# Patient Record
Sex: FEMALE | Race: WHITE | NOT HISPANIC OR LATINO | Employment: STUDENT | URBAN - METROPOLITAN AREA
[De-identification: names, ages, dates, MRNs, and addresses within clinical notes are randomized per-mention and may not be internally consistent; named-entity substitution may affect disease eponyms.]

---

## 2023-09-28 PROBLEM — F43.10 POST TRAUMATIC STRESS DISORDER (PTSD): Status: ACTIVE | Noted: 2023-09-28

## 2023-09-28 PROBLEM — N92.0 HEAVY MENSES: Status: ACTIVE | Noted: 2023-09-28

## 2023-09-28 PROBLEM — R55 SYNCOPE: Status: ACTIVE | Noted: 2023-09-28

## 2023-09-28 PROBLEM — F42.9 OBSESSIVE COMPULSIVE DISORDER: Status: ACTIVE | Noted: 2023-09-28

## 2023-09-28 PROBLEM — F32.1 MODERATE SINGLE CURRENT EPISODE OF MAJOR DEPRESSIVE DISORDER (MULTI): Status: ACTIVE | Noted: 2023-09-28

## 2023-09-28 PROBLEM — J45.20 ASTHMA, INTERMITTENT, UNCOMPLICATED (HHS-HCC): Status: ACTIVE | Noted: 2023-09-28

## 2023-09-28 PROBLEM — F41.1 GENERALIZED ANXIETY DISORDER: Status: ACTIVE | Noted: 2023-09-28

## 2023-09-28 PROBLEM — G43.909 MIGRAINES: Status: ACTIVE | Noted: 2023-09-28

## 2023-09-28 PROBLEM — J45.21 MILD INTERMITTENT ASTHMA WITH ACUTE EXACERBATION (HHS-HCC): Status: ACTIVE | Noted: 2023-09-28

## 2023-09-28 RX ORDER — ALBUTEROL SULFATE 90 UG/1
2 AEROSOL, METERED RESPIRATORY (INHALATION) EVERY 4 HOURS PRN
COMMUNITY
Start: 2016-04-06 | End: 2023-10-17 | Stop reason: SDUPTHER

## 2023-09-28 RX ORDER — CEFDINIR 300 MG/1
2 CAPSULE ORAL DAILY
COMMUNITY
Start: 2019-08-22 | End: 2023-10-17 | Stop reason: SDUPTHER

## 2023-09-28 RX ORDER — PROMETHAZINE HYDROCHLORIDE 6.25 MG/5ML
10 SYRUP ORAL 2 TIMES DAILY
COMMUNITY
Start: 2021-12-08 | End: 2023-11-17 | Stop reason: WASHOUT

## 2023-09-28 RX ORDER — OSELTAMIVIR PHOSPHATE 75 MG/1
1 CAPSULE ORAL 2 TIMES DAILY
COMMUNITY
Start: 2023-01-04 | End: 2023-11-17 | Stop reason: WASHOUT

## 2023-09-28 RX ORDER — ALBUTEROL SULFATE 0.83 MG/ML
SOLUTION RESPIRATORY (INHALATION)
COMMUNITY
Start: 2016-04-06 | End: 2024-04-04 | Stop reason: ALTCHOICE

## 2023-09-28 RX ORDER — ESCITALOPRAM OXALATE 10 MG/1
1 TABLET ORAL DAILY
COMMUNITY
Start: 2022-05-29 | End: 2023-11-17 | Stop reason: WASHOUT

## 2023-09-28 RX ORDER — LEVONORGESTREL 52 MG/1
1 INTRAUTERINE DEVICE INTRAUTERINE ONCE
COMMUNITY

## 2023-09-28 RX ORDER — ESCITALOPRAM OXALATE 20 MG/1
1 TABLET ORAL DAILY
COMMUNITY
Start: 2022-05-29 | End: 2024-03-15

## 2023-09-28 RX ORDER — METHYLPREDNISOLONE 4 MG/1
4 TABLET ORAL
COMMUNITY
Start: 2023-01-04 | End: 2023-10-17 | Stop reason: SDUPTHER

## 2023-09-28 RX ORDER — TRAZODONE HYDROCHLORIDE 50 MG/1
1 TABLET ORAL NIGHTLY
COMMUNITY
Start: 2022-05-29 | End: 2024-04-02 | Stop reason: SDUPTHER

## 2023-10-17 ENCOUNTER — TELEMEDICINE (OUTPATIENT)
Dept: PRIMARY CARE | Facility: CLINIC | Age: 18
End: 2023-10-17
Payer: COMMERCIAL

## 2023-10-17 DIAGNOSIS — J45.40 MODERATE PERSISTENT ASTHMA, UNSPECIFIED WHETHER COMPLICATED (HHS-HCC): Primary | ICD-10-CM

## 2023-10-17 DIAGNOSIS — J01.00 ACUTE NON-RECURRENT MAXILLARY SINUSITIS: ICD-10-CM

## 2023-10-17 PROCEDURE — 99213 OFFICE O/P EST LOW 20 MIN: CPT | Performed by: FAMILY MEDICINE

## 2023-10-17 RX ORDER — CEFDINIR 300 MG/1
600 CAPSULE ORAL DAILY
Qty: 20 CAPSULE | Refills: 0 | Status: SHIPPED | OUTPATIENT
Start: 2023-10-17 | End: 2023-10-27

## 2023-10-17 RX ORDER — METHYLPREDNISOLONE 4 MG/1
4 TABLET ORAL ONCE
Qty: 1 TABLET | Refills: 0 | Status: SHIPPED | OUTPATIENT
Start: 2023-10-17 | End: 2023-10-17

## 2023-10-17 RX ORDER — ALBUTEROL SULFATE 90 UG/1
2 AEROSOL, METERED RESPIRATORY (INHALATION) EVERY 4 HOURS PRN
Qty: 18 G | Refills: 0 | Status: SHIPPED | OUTPATIENT
Start: 2023-10-17

## 2023-10-17 ASSESSMENT — PATIENT HEALTH QUESTIONNAIRE - PHQ9
SUM OF ALL RESPONSES TO PHQ9 QUESTIONS 1 AND 2: 0
2. FEELING DOWN, DEPRESSED OR HOPELESS: NOT AT ALL
1. LITTLE INTEREST OR PLEASURE IN DOING THINGS: NOT AT ALL

## 2023-10-17 ASSESSMENT — ENCOUNTER SYMPTOMS
LOSS OF SENSATION IN FEET: 0
SINUS PAIN: 1
COUGH: 1
OCCASIONAL FEELINGS OF UNSTEADINESS: 0
DEPRESSION: 0
WHEEZING: 1

## 2023-10-26 ENCOUNTER — TELEMEDICINE (OUTPATIENT)
Dept: BEHAVIORAL HEALTH | Facility: CLINIC | Age: 18
End: 2023-10-26
Payer: COMMERCIAL

## 2023-10-26 DIAGNOSIS — F41.1 GENERALIZED ANXIETY DISORDER: Primary | ICD-10-CM

## 2023-10-26 PROCEDURE — 99214 OFFICE O/P EST MOD 30 MIN: CPT | Performed by: PSYCHIATRY & NEUROLOGY

## 2023-10-30 NOTE — PROGRESS NOTES
"Virtual appointment with patient.   Consent obtained for this platform and identification verified.  She was located on campus of [facility].      She is earning good grades at [employer].   She would like to return to [place] and is working on a petition to present to the school.       She has been adherent to Escitalopram 20 mg every morning and Trazodone 50 mg every evening.   Regimen is well-tolerated and helps with mood/anxiety and sleep.     Regarding mood, she reports \"feeling a lot better, looking forward to doing more\".   She adds appetite has improved and weight has achieved a healthier level of 115 pounds compared to 95 pounds in May.      She denies prominent OCD symptoms.       She is earning good grades at [employer], however as noted above wants to return to [employer] because of the [employer] major it offers.         She denies suicidal ideation.  No aggression or self-harm.       No zohaib or hallucinations.        Rare cannabis use.        MSE:   Normal dress and grooming.   Speech normal tone,rate, quality.  Thought process goal-directed.  Euthymic mood, full range of affect.   Denies suicidal or homicidal ideation.   No zohaib or hallucinations.   No agitation   Alert and oriented X 4.   Judgment and insight good.      Dx:  Generalized Anxiety Disorder; Major Depressive Disorder, in remission; Obsessive Compulsive Disorder    Plan:    Continue Escitalopram 20 mg every morning.  Ongoing consent obtained.  Has sufficient supply at home.       Continue Trazodone 50 mg every evening.   Ongoing consent obtained.   Has sufficient supply at home.      Therapy    Letter requested as part of petition to return to [place] will be provided.      Follow-up 2-3 months, call as needed in the interim.          "

## 2023-11-17 ENCOUNTER — OFFICE VISIT (OUTPATIENT)
Dept: OBSTETRICS AND GYNECOLOGY | Facility: CLINIC | Age: 18
End: 2023-11-17
Payer: COMMERCIAL

## 2023-11-17 VITALS
BODY MASS INDEX: 20.99 KG/M2 | HEIGHT: 65 IN | SYSTOLIC BLOOD PRESSURE: 120 MMHG | WEIGHT: 126 LBS | DIASTOLIC BLOOD PRESSURE: 72 MMHG

## 2023-11-17 DIAGNOSIS — R10.2 PELVIC CRAMPING: ICD-10-CM

## 2023-11-17 DIAGNOSIS — N92.6 IRREGULAR BLEEDING: Primary | ICD-10-CM

## 2023-11-17 DIAGNOSIS — T83.32XA MALPOSITIONED INTRAUTERINE DEVICE (IUD), INITIAL ENCOUNTER: ICD-10-CM

## 2023-11-17 LAB — PREGNANCY TEST URINE, POC: NEGATIVE

## 2023-11-17 PROCEDURE — 81025 URINE PREGNANCY TEST: CPT | Performed by: OBSTETRICS & GYNECOLOGY

## 2023-11-17 PROCEDURE — 1036F TOBACCO NON-USER: CPT | Performed by: OBSTETRICS & GYNECOLOGY

## 2023-11-17 PROCEDURE — 99214 OFFICE O/P EST MOD 30 MIN: CPT | Performed by: OBSTETRICS & GYNECOLOGY

## 2023-11-17 ASSESSMENT — ENCOUNTER SYMPTOMS
DIARRHEA: 1
FREQUENCY: 0
ANOREXIA: 0
CONSTIPATION: 1
MYALGIAS: 0
ABDOMINAL PAIN: 1
BELCHING: 1
HEADACHES: 0
HEMATOCHEZIA: 0
WEIGHT LOSS: 0
NAUSEA: 0
DYSPHORIC MOOD: 1
HEMATURIA: 0
VOMITING: 0
DYSURIA: 0
FEVER: 0
FLATUS: 1
ARTHRALGIAS: 0

## 2023-11-17 NOTE — PROGRESS NOTES
HPI    - PT SAYS SHE CAN FEEL THE IUD AND SHE HAS BEEN HAVING SPOTTING THEN REGULAR CYCLE FOR TWO WEEKS STRAIGHT  Last edited by Seng Pastor MA on 11/17/2023 11:52 AM.

## 2023-11-17 NOTE — PROGRESS NOTES
"SUBJECTIVE    19 yo G0 presents with c/o irregular bleeding, cramping.   Last visit 7/2021 for IUD check.  Mirena IUD placed 6/2021.   After initially having some irregular bleeding was then amenorrheic for the year after IUD placement.   Approximately one year ago started to have brownish discharge and over the course of the last few months has had reddish brown bleeding and lower pelvic cramping for several weeks out of the month. Also feels like she can feel the strings.   Recent STD screen negative per pt.   IO Urine preg test negative.   Anxiety on escitalopram, trazodone.   Recently back from [place] for 6 weeks. Taking a year off from [place] to take classes at [employer].     Review of Systems   Constitutional:  Negative for fever.   Gastrointestinal:  Positive for abdominal pain. Negative for nausea and vomiting.   Genitourinary:  Negative for dysuria, frequency and hematuria.   Musculoskeletal:  Negative for arthralgias and myalgias.   Neurological:  Negative for headaches.   Psychiatric/Behavioral:  Positive for dysphoric mood (anxiety).      OBJECTIVE    /72   Ht 1.651 m (5' 5\")   Wt 57.2 kg (126 lb)   LMP 11/03/2023 Comment: IUDM  BMI 20.97 kg/m²     IO Transvaginal ultrasound:  Mirena IUD located lower uterine segment within endometrial canal.     Physical Exam     Constitutional: Alert and in no acute distress. Well developed, well nourished   Abdomen: soft nontender; no abdominal mass palpated, no organomegaly and no hernias   Genitourinary: external genitalia: normal, no inguinal lymphadenopathy, [place]tholin's urethral and [place]'s glands: normal, urethra: normal, bladder: normal on palpation and perianal area: normal   Vagina: normal.   Cervix: Normal appearing without lesions. IUD strings protruding through cervix 5mm next to cervical opening.   Uterus: Normal, mobile, nontender.  Right Adnexa/parametria: Normal.   Left Adnexa/parametria: Normal.   Psychiatric: alert and oriented x 3., affect " normal to patient baseline and mood: appropriate     ASSESSMENT/PLAN  Mirena IUD malpositioned lower uterine segment.   Plan for IUD removal and reinsertion. My office will check insurance coverage.  Back up contraceptive method until replacement.     Aleshia Meek MD

## 2023-11-29 ENCOUNTER — OFFICE VISIT (OUTPATIENT)
Dept: PRIMARY CARE | Facility: CLINIC | Age: 18
End: 2023-11-29
Payer: COMMERCIAL

## 2023-11-29 ENCOUNTER — ANCILLARY PROCEDURE (OUTPATIENT)
Dept: RADIOLOGY | Facility: CLINIC | Age: 18
End: 2023-11-29
Payer: COMMERCIAL

## 2023-11-29 VITALS
OXYGEN SATURATION: 98 % | DIASTOLIC BLOOD PRESSURE: 70 MMHG | HEART RATE: 91 BPM | BODY MASS INDEX: 21.3 KG/M2 | WEIGHT: 128 LBS | SYSTOLIC BLOOD PRESSURE: 108 MMHG

## 2023-11-29 DIAGNOSIS — S86.912A STRAIN OF LEFT KNEE, INITIAL ENCOUNTER: Primary | ICD-10-CM

## 2023-11-29 DIAGNOSIS — S86.912A STRAIN OF LEFT KNEE, INITIAL ENCOUNTER: ICD-10-CM

## 2023-11-29 PROBLEM — R55 SYNCOPE: Status: RESOLVED | Noted: 2023-09-28 | Resolved: 2023-11-29

## 2023-11-29 PROCEDURE — 99213 OFFICE O/P EST LOW 20 MIN: CPT | Performed by: FAMILY MEDICINE

## 2023-11-29 PROCEDURE — 1036F TOBACCO NON-USER: CPT | Performed by: FAMILY MEDICINE

## 2023-11-29 PROCEDURE — 73562 X-RAY EXAM OF KNEE 3: CPT | Mod: LT,FY

## 2023-11-29 PROCEDURE — 73562 X-RAY EXAM OF KNEE 3: CPT | Mod: LEFT SIDE | Performed by: RADIOLOGY

## 2023-11-29 ASSESSMENT — ENCOUNTER SYMPTOMS
ARTHRALGIAS: 1
OCCASIONAL FEELINGS OF UNSTEADINESS: 0
JOINT SWELLING: 1
LOSS OF SENSATION IN FEET: 0
DEPRESSION: 0

## 2023-11-29 NOTE — LETTER
November 29, 2023     Patient: Renetta Chauhan   YOB: 2005   Date of Visit: 11/29/2023       To Whom It May Concern:    Renetta Chauhan was seen in my clinic on 11/29/2023 at 1:00PM. Please excuse Renetta for her absence from school on this day to make the appointment and she can return Monday, 12/4/2023.    If you have any questions or concerns, please don't hesitate to call.         Sincerely,         Lorelei Johns,         CC: No Recipients

## 2023-11-29 NOTE — PROGRESS NOTES
Subjective   Patient ID: Renetta Chauhan is a 18 y.o. female who presents for Fall (FELL ON L KNEE).    Fall       Pt slipped on ice today and twisted her l knee  She is in a lot of pain and is unable to bear weight  It is swollen, hard to extend    Review of Systems   Musculoskeletal:  Positive for arthralgias and joint swelling.   All other systems reviewed and are negative.      Objective   /70   Pulse 91   Wt 58.1 kg (128 lb)   LMP 11/03/2023 Comment: IUDM  SpO2 98%   BMI 21.30 kg/m²     Physical Exam  Constitutional:       Appearance: Normal appearance.   Pulmonary:      Effort: Pulmonary effort is normal.   Musculoskeletal:         General: Swelling and tenderness present.      Comments: Dec rom in all planes l knee  Edema, pain patella and medially   Neurological:      General: No focal deficit present.      Mental Status: She is alert and oriented to person, place, and time.   Psychiatric:         Mood and Affect: Mood normal.         Behavior: Behavior normal.         Assessment/Plan   Diagnoses and all orders for this visit:  Strain of left knee, initial encounter    Xray  RICE therapy   Brace crutches

## 2023-12-01 ENCOUNTER — TELEPHONE (OUTPATIENT)
Dept: OBSTETRICS AND GYNECOLOGY | Facility: CLINIC | Age: 18
End: 2023-12-01
Payer: COMMERCIAL

## 2024-03-05 ENCOUNTER — TELEPHONE (OUTPATIENT)
Dept: BEHAVIORAL HEALTH | Facility: CLINIC | Age: 19
End: 2024-03-05
Payer: COMMERCIAL

## 2024-03-11 RX ORDER — ESCITALOPRAM OXALATE 20 MG/1
20 TABLET ORAL DAILY
Qty: 90 TABLET | Refills: 0 | OUTPATIENT
Start: 2024-03-11

## 2024-03-11 RX ORDER — TRAZODONE HYDROCHLORIDE 50 MG/1
50 TABLET ORAL NIGHTLY
Qty: 90 TABLET | Refills: 0 | OUTPATIENT
Start: 2024-03-11

## 2024-03-14 DIAGNOSIS — F41.1 GENERALIZED ANXIETY DISORDER: Primary | ICD-10-CM

## 2024-03-15 RX ORDER — ESCITALOPRAM OXALATE 20 MG/1
20 TABLET ORAL DAILY
Qty: 90 TABLET | Refills: 0 | Status: SHIPPED | OUTPATIENT
Start: 2024-03-15

## 2024-03-16 DIAGNOSIS — Z20.828 EXPOSURE TO INFLUENZA: Primary | ICD-10-CM

## 2024-03-16 RX ORDER — OSELTAMIVIR PHOSPHATE 75 MG/1
75 CAPSULE ORAL DAILY
Qty: 10 CAPSULE | Refills: 0 | Status: SHIPPED | OUTPATIENT
Start: 2024-03-16 | End: 2024-03-26

## 2024-04-02 ENCOUNTER — TELEMEDICINE (OUTPATIENT)
Dept: BEHAVIORAL HEALTH | Facility: CLINIC | Age: 19
End: 2024-04-02
Payer: COMMERCIAL

## 2024-04-02 DIAGNOSIS — F41.1 GENERALIZED ANXIETY DISORDER: Primary | ICD-10-CM

## 2024-04-02 PROCEDURE — 99214 OFFICE O/P EST MOD 30 MIN: CPT | Performed by: PSYCHIATRY & NEUROLOGY

## 2024-04-02 RX ORDER — TRAZODONE HYDROCHLORIDE 50 MG/1
50 TABLET ORAL NIGHTLY
Qty: 90 TABLET | Refills: 1 | Status: SHIPPED | OUTPATIENT
Start: 2024-04-02 | End: 2024-09-29

## 2024-04-04 NOTE — PROGRESS NOTES
"Virtual appointment with patient.  Consent obtained for this platform and identification verified.  She was located at [facility].      Patient reports \"everything is fine\".      Petition to return to [employer] accepted and she has been attending since January.   She is earning good grades.       She has been adherent to Escitalopram 20 mg every morning and Trazodone 50 mg every evening.  She denies adverse effects.  Regimen helps with mood/anxiety and sleep.      She denies suicidal ideation or self-harm.  No aggression.    No zohaib or hallucinations.       Some OCD type checking behaviors however not time consuming nor interfering with functioning.       She reports social cannabis use a couple times/week.      Involved discussion during appointment about her tendency to ask friends for their prescribed stimulants when she has more extensive studying to do.  She has purchased on a few occasions and readily admits taking is counterproductive and she gets less done rather than more.  We also reviewed comprehensive risks associated.  She agreed to stop and we will also pursue appropriate therapist to process.      She denies physical complaints.      MSE:   Normal dress and grooming.  Thought process goal-directed.  Speech normal tone, rate, quality.  Euthymic mood, normal range of affect.   Denies suicidal or homicidal ideation.  Alert and oriented. X 4.  No agitation.   Judgment and insight fair    Dx:  Generalized Anxiety Disorder; Obsessive Compulsive Disorder;  Major Depressive Disorder, in remission    Plan:    Continue Escitalopram 20 mg every morning.  Ongoing consent obtained.   Has sufficient supply.      Continue Trazodone 50 mg every evening.   Ongoing consent obtained.  Rx for 50 mg #90 with one refill sent to [facility]    Seeking therapist per above discussion.     Follow-up in 3 months, call as needed in the interim      "

## 2024-08-21 DIAGNOSIS — F41.1 GENERALIZED ANXIETY DISORDER: ICD-10-CM

## 2024-08-21 RX ORDER — ESCITALOPRAM OXALATE 20 MG/1
20 TABLET ORAL DAILY
Qty: 90 TABLET | Refills: 1 | Status: SHIPPED | OUTPATIENT
Start: 2024-08-21

## 2024-08-21 RX ORDER — TRAZODONE HYDROCHLORIDE 50 MG/1
50 TABLET ORAL NIGHTLY
Qty: 90 TABLET | Refills: 1 | Status: SHIPPED | OUTPATIENT
Start: 2024-08-21 | End: 2025-02-17

## 2024-08-22 ENCOUNTER — TELEMEDICINE (OUTPATIENT)
Dept: BEHAVIORAL HEALTH | Facility: CLINIC | Age: 19
End: 2024-08-22
Payer: COMMERCIAL

## 2024-08-22 DIAGNOSIS — F41.1 GENERALIZED ANXIETY DISORDER: Primary | ICD-10-CM

## 2024-08-22 PROCEDURE — 99213 OFFICE O/P EST LOW 20 MIN: CPT | Performed by: PSYCHIATRY & NEUROLOGY

## 2024-08-23 NOTE — PROGRESS NOTES
Virtual appointment with patient via zoom due to having trouble with [email or web address] access.  Consent obtained for this platform and identification verified.   She was located at home in [place].       She has been mostly adherent to medication regimen of Escitalopram 20 mg every morning and Trazodone 50 mg every evening.  She states they continue to help with mood and sleep and denies adverse effects.      She had 2 separate one week periods of not taking the medications and she felt more anxious/irritable and had more difficulty sleeping.      Denies OCD symptoms     She denies any sustained sadness.  She denies suicidal ideation or self-harm.    No zohaib or hallucinations.     Infrequent cannabis use.      She did well academically last semester at [employer].    She is future-oriented ie thinking about [employer] after graduation.      As follow-up to discussion at last visit, there was no further use of peer stimulant medication for lengthy study sessions.      MSE:  Normal dress and grooming.   Thought process goal-directed.  Speech normal tone, rate, quality.  Euthymic mood, full range of affect.   Denies suicidal or homicidal ideation.   Alert and oriented X 4.  Future-oriented.   Judgment and insight fair/good    Dx:  Generalized Anxiety Disorder; Obsessive Compulsive Disorder;  Major Depressive Disorder, in remission    Plan:    Continue Escitalopram 20 mg every morning.   Ongoing consent obtained.  90 day Rx sent 8/21    Continue Trazodone 50 mg every evening.   Ongoing consent obtained.  90 day Rx sent 8/21    Follow-up 12/2024, call as needed in the interim

## 2024-09-04 DIAGNOSIS — J45.40 MODERATE PERSISTENT ASTHMA, UNSPECIFIED WHETHER COMPLICATED (HHS-HCC): ICD-10-CM

## 2024-09-04 RX ORDER — AZITHROMYCIN 250 MG/1
TABLET, FILM COATED ORAL
Qty: 6 TABLET | Refills: 0 | Status: SHIPPED | OUTPATIENT
Start: 2024-09-04 | End: 2024-09-09

## 2024-09-04 RX ORDER — ALBUTEROL SULFATE 90 UG/1
2 INHALANT RESPIRATORY (INHALATION) EVERY 4 HOURS PRN
Qty: 18 G | Refills: 0 | Status: SHIPPED | OUTPATIENT
Start: 2024-09-04

## 2025-02-17 ENCOUNTER — APPOINTMENT (OUTPATIENT)
Dept: BEHAVIORAL HEALTH | Facility: CLINIC | Age: 20
End: 2025-02-17
Payer: COMMERCIAL

## 2025-02-17 DIAGNOSIS — F41.1 GENERALIZED ANXIETY DISORDER: Primary | ICD-10-CM

## 2025-02-17 PROCEDURE — 99213 OFFICE O/P EST LOW 20 MIN: CPT | Performed by: PSYCHIATRY & NEUROLOGY

## 2025-02-18 NOTE — PROGRESS NOTES
"Virtual appointment with patient.  Consent obtained for this platform and identification verified.  She was located at home in [place].     She decided to take a semester off from college and when resumes school in the fall most likely will not return to [place] because of a socially challenging environment there.    She is considering [place] or [place]    She is looking for a job now.     Since returning home from [place] her mood has improved.    While at [employer] she reported feeling depressed along with \"struggle to get out of bed and low motivation\".  She states the environment at [employer] led to that mood rather than the mood leading to not liking the college experience.      Since returning home she reports her mood as \"a lot better\".    Sleeping well with use of Trazodone 50 mg now as needed.   She is getting regular exercise.     Has been mostly adherent to Escitalopram 20 mg every morning which she states continues to help with mood/anxiety.    She denies prominent OCD symptoms    Uses cannabis more days than not, aware of risks    No zohaib or hallucinations    She reports positive support system with family and friends.      MSE:  Normal dress and grooming.  Thought process goal-directed.  Speech normal tone, rate, quality.  Euthymic mood, full range of affect.   Denies suicidal or homicidal ideation.  No agitation.  Alert and oriented X 4.   Judgment and insight good    Dx:  Generalized Anxiety Disorder; Obsessive Compulsive Disorder;  Major Depressive Disorder, recurrent, mild    Plan:    Continue Escitalopram 20 mg every morning.  Ongoing consent obtained.  Has sufficient supply    Continue Trazodone 50 mg as needed for insomnia.  Ongoing consent obtained.  Has sufficient supply    Therapy    Follow-up in 3 months, call as needed in the interim      "

## 2025-02-20 ENCOUNTER — OFFICE VISIT (OUTPATIENT)
Dept: OBSTETRICS AND GYNECOLOGY | Facility: CLINIC | Age: 20
End: 2025-02-20
Payer: COMMERCIAL

## 2025-02-20 VITALS — DIASTOLIC BLOOD PRESSURE: 60 MMHG | BODY MASS INDEX: 23.13 KG/M2 | SYSTOLIC BLOOD PRESSURE: 110 MMHG | WEIGHT: 139 LBS

## 2025-02-20 DIAGNOSIS — Z11.3 SCREEN FOR STD (SEXUALLY TRANSMITTED DISEASE): ICD-10-CM

## 2025-02-20 DIAGNOSIS — Z01.419 ENCOUNTER FOR WELL WOMAN EXAM WITH ROUTINE GYNECOLOGICAL EXAM: Primary | ICD-10-CM

## 2025-02-20 DIAGNOSIS — N92.6 IRREGULAR BLEEDING: ICD-10-CM

## 2025-02-20 DIAGNOSIS — Z32.02 PREGNANCY TEST NEGATIVE: ICD-10-CM

## 2025-02-20 DIAGNOSIS — Z12.4 CERVICAL CANCER SCREENING: ICD-10-CM

## 2025-02-20 DIAGNOSIS — Z30.011 ENCOUNTER FOR ORAL CONTRACEPTION INITIAL PRESCRIPTION: ICD-10-CM

## 2025-02-20 DIAGNOSIS — Z30.432 ENCOUNTER FOR IUD REMOVAL: ICD-10-CM

## 2025-02-20 LAB — PREGNANCY TEST URINE, POC: NEGATIVE

## 2025-02-20 PROCEDURE — 87591 N.GONORRHOEAE DNA AMP PROB: CPT

## 2025-02-20 PROCEDURE — 88175 CYTOPATH C/V AUTO FLUID REDO: CPT

## 2025-02-20 PROCEDURE — 87491 CHLMYD TRACH DNA AMP PROBE: CPT

## 2025-02-20 RX ORDER — NORETHINDRONE ACETATE AND ETHINYL ESTRADIOL 1MG-20(21)
1 KIT ORAL DAILY
Qty: 84 TABLET | Refills: 3 | Status: SHIPPED | OUTPATIENT
Start: 2025-02-20 | End: 2026-02-20

## 2025-02-20 NOTE — PROGRESS NOTES
ASSESSMENT/PLAN    Encounter for well woman exam with routine gynecological exam (Primary)  Routine well woman visit.   Your exam was normal today.   A pap test was done. If you are signed onto the  MY CHART, you will be notified about your pap results through the MY CHART in 2-3 weeks.      Pregnancy test negative  Irregular bleeding  - POCT pregnancy, urine manually resulted    IUD removal    Encounter for oral contraception initial prescription  Start oral contraceptives today. Rx sent to your pharmacy.  Use a back up method of contraception until on the pill for 2 weeks.   - norethindrone-e.estradioL-iron (Junel FE 1/20) 1 mg-20 mcg (21)/75 mg (7) tablet; Take 1 tablet by mouth once daily.  Dispense: 84 tablet; Refill: 3        Screen for STD (sexually transmitted disease)  GC/CT cervical cultures done.   Results by phone.     --------------------------------------------------------------------------------------------------    SUBJECTIVE    Pap never   Std screen 6-1-21 neg neg   Gardasil x1     HPI    18 yo G0 for routine well woman exam. Also requests IUD removal.  Last visit 11/2023 for irregular bleeding with IUD.   Pt requests IUD removal. Has irregular bleeding with the IUD. Wants to go on the pill.  Mirena IUD placed 6/2021.   Continues on escitalopram, trazodone for anxiety.  Sexually active. Accepts offered STD screen.   Taking another year off from [place].   PGM breast CA. No other family history of other female cancers.     REVIEW OF SYSTEMS    Constitutional: no recent weight gain, no fatigue.   ENT: no hearing loss.  Cardiovascular: no chest pain, no palpitations.  Respiratory: no shortness of breath.  Gastrointestinal: no abdominal pain, no constipation, no nausea, no diarrhea.  Musculoskeletal: no back pain.  Lymphatic: no swollen glands.  Genitourinary: no pelvic pain, no urinary urgency, no urinary incontinence, no change in urinary frequency, no vaginal dryness, no vaginal itching,  no  vaginal discharge, no unexplained vaginal bleeding, no lesion/sore.   Breasts: no breast pain, no nipple discharge, no breast lump.   Neurological: no headache, no numbness, no dizziness.   Psychiatric: no sleep disturbances, no anxiety, no depression.   Endocrine: no hot flashes, no loss of hair, no hirsutism.       OBJECTIVE    /60   Wt 63 kg (139 lb)   LMP 02/18/2025 (Exact Date)   BMI 23.13 kg/m²     Physical Exam     Constitutional: Alert and in no acute distress. Well developed, well nourished   Head and Face: Head and face: normal   Eyes: Normal external exam - nonicteric sclera.  Ears, Nose, Mouth, and Throat: External inspection of ears and nose: normal   Neck: no neck asymmetry. Supple and thyroid not enlarged and there were no palpable thyroid nodules   Cardiovascular: Heart rate and rhythm were normal  Pulmonary: No respiratory distress and clear bilateral breath sounds   Chest: Breasts: normal appearance, no nipple discharge, no skin changes. Palpation of breasts and axillae: no palpable mass, no axillary lymphadenopathy   Abdomen: soft nontender; no abdominal mass palpated, no organomegaly and no hernias   Genitourinary: external genitalia: normal appearing vulva and labia without lesions. No inguinal lymphadenopathy,    Urethra: normal.   Bladder: normal on palpation.   Perianal area: normal   Vagina: normal, without significant discharge, no lesions.  Cervix: Normal appearing without lesions. Strings not seen.   Uterus: Normal, mobile, nontender.  Right and left adnexa/parametria: Normal  Skin: normal skin color and pigmentation, normal skin turgor, and no rash  Psychiatric: alert and oriented x 3., affect normal to patient baseline and mood: appropriate     IUD Removal    Performed by: Aleshia Meek MD  Authorized by: Aleshia Meek MD    Procedure: IUD removal    Reason for removal: patient request    Strings visualized: no    Cervix cleaned with: chlorhexidine    Tenaculum applied to  cervix: no    IUD grasped by forceps: yes    IUD removed: yes    Removed without complications: yes    IUD intact: yes    Cervix manually dilated: no     IUD strings not visualized.   IUD removed hook used to bring strings through os.    Aleshia Meek MD

## 2025-02-21 LAB
C TRACH RRNA SPEC QL NAA+PROBE: NEGATIVE
N GONORRHOEA DNA SPEC QL PROBE+SIG AMP: NEGATIVE

## 2025-03-06 LAB
CYTOLOGY CMNT CVX/VAG CYTO-IMP: NORMAL
LAB AP CONTRACEPTIVE HISTORY: NORMAL
LAB AP HPV GENOTYPE QUESTION: YES
LAB AP HPV HR: NORMAL
LAB AP PAP ADDITIONAL TESTS: NORMAL
LABORATORY COMMENT REPORT: NORMAL
LMP START DATE: NORMAL
PATH REPORT.TOTAL CANCER: NORMAL

## 2025-05-22 DIAGNOSIS — F41.1 GENERALIZED ANXIETY DISORDER: ICD-10-CM

## 2025-05-22 RX ORDER — ESCITALOPRAM OXALATE 20 MG/1
20 TABLET ORAL DAILY
Qty: 30 TABLET | Refills: 0 | Status: SHIPPED | OUTPATIENT
Start: 2025-05-22

## 2025-06-19 DIAGNOSIS — F41.1 GENERALIZED ANXIETY DISORDER: ICD-10-CM

## 2025-06-19 RX ORDER — ESCITALOPRAM OXALATE 20 MG/1
20 TABLET ORAL DAILY
Qty: 30 TABLET | Refills: 0 | Status: SHIPPED | OUTPATIENT
Start: 2025-06-19

## 2025-08-06 DIAGNOSIS — F41.1 GENERALIZED ANXIETY DISORDER: ICD-10-CM

## 2025-08-06 RX ORDER — TRAZODONE HYDROCHLORIDE 50 MG/1
50 TABLET ORAL NIGHTLY
Qty: 30 TABLET | Refills: 0 | Status: SHIPPED | OUTPATIENT
Start: 2025-08-06 | End: 2025-09-05

## 2025-08-06 RX ORDER — ESCITALOPRAM OXALATE 20 MG/1
20 TABLET ORAL DAILY
Qty: 30 TABLET | Refills: 0 | Status: SHIPPED | OUTPATIENT
Start: 2025-08-06

## 2025-08-14 ENCOUNTER — APPOINTMENT (OUTPATIENT)
Dept: BEHAVIORAL HEALTH | Facility: CLINIC | Age: 20
End: 2025-08-14
Payer: COMMERCIAL

## 2025-08-14 DIAGNOSIS — F41.1 GENERALIZED ANXIETY DISORDER: Primary | ICD-10-CM

## 2025-08-14 PROCEDURE — 99213 OFFICE O/P EST LOW 20 MIN: CPT | Performed by: PSYCHIATRY & NEUROLOGY
